# Patient Record
Sex: FEMALE | Race: OTHER | NOT HISPANIC OR LATINO
[De-identification: names, ages, dates, MRNs, and addresses within clinical notes are randomized per-mention and may not be internally consistent; named-entity substitution may affect disease eponyms.]

---

## 2020-11-29 ENCOUNTER — TRANSCRIPTION ENCOUNTER (OUTPATIENT)
Age: 25
End: 2020-11-29

## 2022-04-27 ENCOUNTER — EMERGENCY (EMERGENCY)
Facility: HOSPITAL | Age: 27
LOS: 1 days | Discharge: HOME | End: 2022-04-27
Attending: STUDENT IN AN ORGANIZED HEALTH CARE EDUCATION/TRAINING PROGRAM
Payer: MEDICAID

## 2022-04-27 VITALS
DIASTOLIC BLOOD PRESSURE: 76 MMHG | HEART RATE: 53 BPM | TEMPERATURE: 99 F | OXYGEN SATURATION: 99 % | WEIGHT: 126.99 LBS | HEIGHT: 65 IN | SYSTOLIC BLOOD PRESSURE: 115 MMHG | RESPIRATION RATE: 15 BRPM

## 2022-04-27 RX ORDER — METHOCARBAMOL 500 MG/1
2 TABLET, FILM COATED ORAL
Qty: 18 | Refills: 0
Start: 2022-04-27 | End: 2022-04-29

## 2022-04-27 RX ORDER — IBUPROFEN 200 MG
1 TABLET ORAL
Qty: 20 | Refills: 0
Start: 2022-04-27 | End: 2022-05-01

## 2022-04-27 NOTE — ED PROVIDER NOTE - NSFOLLOWUPINSTRUCTIONS_ED_ALL_ED_FT
Follow up with the dentist within 1 week.     Pain medications sent to the pharmacy for you.     If you experience any new or worsening symptoms or if you are concerned you can always come back to the emergency for a re-evaluation.       Temporomandibular Joint Syndrome       Temporomandibular joint syndrome (TMJ syndrome) is a condition that causes pain in the temporomandibular joints. These joints are located near your ears and allow your jaw to open and close. For people with TMJ syndrome, chewing, biting, or other movements of the jaw can be difficult or painful.    TMJ syndrome is often mild and goes away within a few weeks. However, sometimes the condition becomes a long-term (chronic) problem.      What are the causes?    This condition may be caused by:  •Grinding your teeth or clenching your jaw. Some people do this when they are under stress.      •Arthritis.      •Injury to the jaw.      •Head or neck injury.      •Teeth or dentures that are not aligned well.      In some cases, the cause of TMJ syndrome may not be known.      What are the signs or symptoms?    The most common symptom of this condition is an aching pain on the side of the head in the area of the TMJ. Other symptoms may include:  •Pain when moving your jaw, such as when chewing or biting.      •Being unable to open your jaw all the way.      •Making a clicking sound when you open your mouth.      •Headache.      •Earache.      •Neck or shoulder pain.        How is this diagnosed?    This condition may be diagnosed based on:  •Your symptoms and medical history.      •A physical exam. Your health care provider may check the range of motion of your jaw.      •Imaging tests, such as X-rays or an MRI.      You may also need to see your dentist, who will determine if your teeth and jaw are lined up correctly.      How is this treated?    TMJ syndrome often goes away on its own. If treatment is needed, the options may include:  •Eating soft foods and applying ice or heat.      •Medicines to relieve pain or inflammation.      •Medicines or massage to relax the muscles.      •A splint, bite plate, or mouthpiece to prevent teeth grinding or jaw clenching.      •Relaxation techniques or counseling to help reduce stress.      •A therapy for pain in which an electrical current is applied to the nerves through the skin (transcutaneous electrical nerve stimulation).      •Acupuncture. This is sometimes helpful to relieve pain.      •Jaw surgery. This is rarely needed.        Follow these instructions at home:     Eating and drinking     •Eat a soft diet if you are having trouble chewing.      •Avoid foods that require a lot of chewing. Do not chew gum.      General instructions     •Take over-the-counter and prescription medicines only as told by your health care provider.    •If directed, put ice on the painful area.  •Put ice in a plastic bag.      •Place a towel between your skin and the bag.      •Leave the ice on for 20 minutes, 2–3 times a day.        •Apply a warm, wet cloth (warm compress) to the painful area as directed.      •Massage your jaw area and do any jaw stretching exercises as told by your health care provider.      •If you were given a splint, bite plate, or mouthpiece, wear it as told by your health care provider.      •Keep all follow-up visits as told by your health care provider. This is important.        Contact a health care provider if:    •You are having trouble eating.      •You have new or worsening symptoms.        Get help right away if:    •Your jaw locks open or closed.        Summary    •Temporomandibular joint syndrome (TMJ syndrome) is a condition that causes pain in the temporomandibular joints. These joints are located near your ears and allow your jaw to open and close.      •TMJ syndrome is often mild and goes away within a few weeks. However, sometimes the condition becomes a long-term (chronic) problem.      •Symptoms include an aching pain on the side of the head in the area of the TMJ, pain when chewing or biting, and being unable to open your jaw all the way. You may also make a clicking sound when you open your mouth.      •TMJ syndrome often goes away on its own. If treatment is needed, it may include medicines to relieve pain, reduce inflammation, or relax the muscles. A splint, bite plate, or mouthpiece may also be used to prevent teeth grinding or jaw clenching.      This information is not intended to replace advice given to you by your health care provider. Make sure you discuss any questions you have with your health care provider.

## 2022-04-27 NOTE — ED ADULT NURSE NOTE - NS ED NOTE ABUSE SUSPICION NEGLECT YN
Medical Nutrition Therapy  Metabolic and Bariatric surgery  Annual follow up note         Pt reports: She has had a significant change in intake, choosing more nutritious options while still allowing for cravings in reasonable portions. Vitals:   Vitals:    02/11/22 1408   BP: 132/74   Site: Right Upper Arm   Position: Sitting   Cuff Size: Large Adult   Pulse: 76   Resp: 20   Weight: 274 lb (124.3 kg)   Height: 5' 3\" (1.6 m)      Body mass index is 48.54 kg/m². Labs reviewed:     Multivitamin/mineral intake:daily  Calcium intake:   daily  Other:            Nutrition Assessment:   PES: Inadequate food and beverage intake r/t WLS as evidenced by loss of excess body weight lost 14  lbs over 1 mo. Goals   60-80gm of protein  48-64oz of fluid  Vitamin adherance  Basic adherance to WLS behavious and this document has been scanned into the chart.        [x] met     []  Not met        Plan:   F/u annually         Chin Brooks, MS, RD, LD No

## 2022-04-27 NOTE — ED ADULT TRIAGE NOTE - CHIEF COMPLAINT QUOTE
c/o pain to left jaw from eating crunchy food 3 days a go , h/o broken jaw both jaw 2 years a go from dental procedure as per patient

## 2022-04-27 NOTE — ED PROVIDER NOTE - PATIENT PORTAL LINK FT
You can access the FollowMyHealth Patient Portal offered by [facility] by registering at the following website: [email or web address]. By joining [employer] [facility]’s FollowMyHealth portal, you will also be able to view your health information using other applications (apps) compatible with our system.

## 2022-04-27 NOTE — ED PROVIDER NOTE - NS ED ATTENDING STATEMENT MOD
This was a shared visit with the IRENE. I reviewed and verified the documentation and independently performed the documented:

## 2022-04-27 NOTE — ED PROVIDER NOTE - OBJECTIVE STATEMENT
25 y/o female, history of broken jaw 2 years ago, w/ left-sided jaw pain that began 3 days ago after eating pork. Patient reports since then she has had pain w/ chewing and limited movement. Has not taken any medications for her symptoms. No known drug allergies.

## 2022-04-27 NOTE — ED PROVIDER NOTE - ATTENDING APP SHARED VISIT CONTRIBUTION OF CARE
PAtient presenting with jaw pain  Mouth: tenderness to the left temporomandibular joint, full range of motion  well appearing  clinically appear to be tmj syndrome, will give pain med, return precaution and dental f.u

## 2024-01-02 ENCOUNTER — NON-APPOINTMENT (OUTPATIENT)
Age: 29
End: 2024-01-02

## 2024-01-09 PROBLEM — S02.609A FRACTURE OF MANDIBLE, UNSPECIFIED, INITIAL ENCOUNTER FOR CLOSED FRACTURE: Chronic | Status: ACTIVE | Noted: 2022-04-27

## 2024-01-16 ENCOUNTER — APPOINTMENT (OUTPATIENT)
Dept: INTERNAL MEDICINE | Facility: CLINIC | Age: 29
End: 2024-01-16

## 2024-04-02 PROBLEM — Z00.00 ENCOUNTER FOR PREVENTIVE HEALTH EXAMINATION: Status: ACTIVE | Noted: 2024-04-02

## 2024-04-12 ENCOUNTER — APPOINTMENT (OUTPATIENT)
Dept: INTERNAL MEDICINE | Facility: CLINIC | Age: 29
End: 2024-04-12